# Patient Record
Sex: FEMALE | Race: WHITE | NOT HISPANIC OR LATINO | URBAN - METROPOLITAN AREA
[De-identification: names, ages, dates, MRNs, and addresses within clinical notes are randomized per-mention and may not be internally consistent; named-entity substitution may affect disease eponyms.]

---

## 2017-11-18 ENCOUNTER — EMERGENCY (EMERGENCY)
Facility: HOSPITAL | Age: 42
LOS: 1 days | Discharge: HOME | End: 2017-11-18
Admitting: EMERGENCY MEDICINE
Payer: MEDICAID

## 2017-11-18 VITALS
DIASTOLIC BLOOD PRESSURE: 97 MMHG | SYSTOLIC BLOOD PRESSURE: 144 MMHG | TEMPERATURE: 98 F | OXYGEN SATURATION: 100 % | RESPIRATION RATE: 18 BRPM | HEART RATE: 77 BPM

## 2017-11-18 DIAGNOSIS — Z79.899 OTHER LONG TERM (CURRENT) DRUG THERAPY: ICD-10-CM

## 2017-11-18 DIAGNOSIS — Z79.891 LONG TERM (CURRENT) USE OF OPIATE ANALGESIC: ICD-10-CM

## 2017-11-18 DIAGNOSIS — R53.81 OTHER MALAISE: ICD-10-CM

## 2017-11-18 DIAGNOSIS — R50.9 FEVER, UNSPECIFIED: ICD-10-CM

## 2017-11-18 DIAGNOSIS — R53.1 WEAKNESS: ICD-10-CM

## 2017-11-18 DIAGNOSIS — R11.2 NAUSEA WITH VOMITING, UNSPECIFIED: ICD-10-CM

## 2017-11-18 DIAGNOSIS — R51 HEADACHE: ICD-10-CM

## 2017-11-18 LAB
ALBUMIN SERPL ELPH-MCNC: 3.8 G/DL — SIGNIFICANT CHANGE UP (ref 3.4–5)
ALP SERPL-CCNC: 36 U/L — LOW (ref 40–120)
ALT FLD-CCNC: 21 U/L — SIGNIFICANT CHANGE UP (ref 12–42)
ANION GAP SERPL CALC-SCNC: 5 MMOL/L — LOW (ref 9–16)
APPEARANCE UR: CLEAR — SIGNIFICANT CHANGE UP
AST SERPL-CCNC: 15 U/L — SIGNIFICANT CHANGE UP (ref 15–37)
BILIRUB SERPL-MCNC: 0.6 MG/DL — SIGNIFICANT CHANGE UP (ref 0.2–1.2)
BILIRUB UR-MCNC: NEGATIVE — SIGNIFICANT CHANGE UP
BUN SERPL-MCNC: 19 MG/DL — SIGNIFICANT CHANGE UP (ref 7–23)
CALCIUM SERPL-MCNC: 9.1 MG/DL — SIGNIFICANT CHANGE UP (ref 8.5–10.5)
CHLORIDE SERPL-SCNC: 105 MMOL/L — SIGNIFICANT CHANGE UP (ref 96–108)
CO2 SERPL-SCNC: 27 MMOL/L — SIGNIFICANT CHANGE UP (ref 22–31)
COLOR SPEC: YELLOW — SIGNIFICANT CHANGE UP
CREAT SERPL-MCNC: 0.86 MG/DL — SIGNIFICANT CHANGE UP (ref 0.5–1.3)
DIFF PNL FLD: NEGATIVE — SIGNIFICANT CHANGE UP
GLUCOSE SERPL-MCNC: 73 MG/DL — SIGNIFICANT CHANGE UP (ref 70–99)
GLUCOSE UR QL: NEGATIVE — SIGNIFICANT CHANGE UP
HCG UR QL: NEGATIVE — SIGNIFICANT CHANGE UP
HCT VFR BLD CALC: 39.4 % — SIGNIFICANT CHANGE UP (ref 34.5–45)
HGB BLD-MCNC: 13.6 G/DL — SIGNIFICANT CHANGE UP (ref 11.5–15.5)
KETONES UR-MCNC: NEGATIVE — SIGNIFICANT CHANGE UP
LEUKOCYTE ESTERASE UR-ACNC: NEGATIVE — SIGNIFICANT CHANGE UP
MCHC RBC-ENTMCNC: 31.2 PG — SIGNIFICANT CHANGE UP (ref 27–34)
MCHC RBC-ENTMCNC: 34.5 G/DL — SIGNIFICANT CHANGE UP (ref 32–36)
MCV RBC AUTO: 90.4 FL — SIGNIFICANT CHANGE UP (ref 80–100)
NITRITE UR-MCNC: NEGATIVE — SIGNIFICANT CHANGE UP
PH UR: 7 — SIGNIFICANT CHANGE UP (ref 5–8)
PLATELET # BLD AUTO: 296 K/UL — SIGNIFICANT CHANGE UP (ref 150–400)
POTASSIUM SERPL-MCNC: 4.1 MMOL/L — SIGNIFICANT CHANGE UP (ref 3.5–5.3)
POTASSIUM SERPL-SCNC: 4.1 MMOL/L — SIGNIFICANT CHANGE UP (ref 3.5–5.3)
PROT SERPL-MCNC: 6.8 G/DL — SIGNIFICANT CHANGE UP (ref 6.4–8.2)
PROT UR-MCNC: NEGATIVE MG/DL — SIGNIFICANT CHANGE UP
RBC # BLD: 4.36 M/UL — SIGNIFICANT CHANGE UP (ref 3.8–5.2)
RBC # FLD: 11.5 % — SIGNIFICANT CHANGE UP (ref 10.3–16.9)
SODIUM SERPL-SCNC: 137 MMOL/L — SIGNIFICANT CHANGE UP (ref 132–145)
SP GR SPEC: 1.01 — SIGNIFICANT CHANGE UP (ref 1–1.03)
UROBILINOGEN FLD QL: 0.2 E.U./DL — SIGNIFICANT CHANGE UP
WBC # BLD: 10.8 K/UL — HIGH (ref 3.8–10.5)
WBC # FLD AUTO: 10.8 K/UL — HIGH (ref 3.8–10.5)

## 2017-11-18 PROCEDURE — 99284 EMERGENCY DEPT VISIT MOD MDM: CPT

## 2017-11-18 PROCEDURE — 74177 CT ABD & PELVIS W/CONTRAST: CPT | Mod: 26

## 2017-11-18 RX ORDER — SODIUM CHLORIDE 9 MG/ML
1000 INJECTION INTRAMUSCULAR; INTRAVENOUS; SUBCUTANEOUS ONCE
Qty: 0 | Refills: 0 | Status: COMPLETED | OUTPATIENT
Start: 2017-11-18 | End: 2017-11-18

## 2017-11-18 RX ORDER — ONDANSETRON 8 MG/1
4 TABLET, FILM COATED ORAL ONCE
Qty: 0 | Refills: 0 | Status: COMPLETED | OUTPATIENT
Start: 2017-11-18 | End: 2017-11-18

## 2017-11-18 RX ORDER — METOCLOPRAMIDE HCL 10 MG
10 TABLET ORAL ONCE
Qty: 0 | Refills: 0 | Status: COMPLETED | OUTPATIENT
Start: 2017-11-18 | End: 2017-11-18

## 2017-11-18 RX ORDER — IOHEXOL 300 MG/ML
50 INJECTION, SOLUTION INTRAVENOUS ONCE
Qty: 0 | Refills: 0 | Status: COMPLETED | OUTPATIENT
Start: 2017-11-18 | End: 2017-11-18

## 2017-11-18 RX ORDER — KETOROLAC TROMETHAMINE 30 MG/ML
30 SYRINGE (ML) INJECTION ONCE
Qty: 0 | Refills: 0 | Status: DISCONTINUED | OUTPATIENT
Start: 2017-11-18 | End: 2017-11-18

## 2017-11-18 RX ADMIN — IOHEXOL 50 MILLILITER(S): 300 INJECTION, SOLUTION INTRAVENOUS at 21:40

## 2017-11-18 RX ADMIN — ONDANSETRON 4 MILLIGRAM(S): 8 TABLET, FILM COATED ORAL at 21:29

## 2017-11-18 RX ADMIN — SODIUM CHLORIDE 1000 MILLILITER(S): 9 INJECTION INTRAMUSCULAR; INTRAVENOUS; SUBCUTANEOUS at 21:29

## 2017-11-18 NOTE — ED PROVIDER NOTE - NS ED ROS FT
Denies diarrhea, constipation, abdominal pain, urinary symptoms, chest pain, palpitations, shortness of breath, dyspnea on exertion, syncope/near syncope, cough/URI symptoms, headache, weakness, numbness, focal deficits, visual changes, gait or balance changes, dizziness

## 2017-11-18 NOTE — ED PROVIDER NOTE - PHYSICAL EXAMINATION
VITAL SIGNS: I have reviewed nursing notes and confirm.  CONSTITUTIONAL: Well-developed; well-nourished; in no acute distress.  SKIN: Skin is warm and dry, no acute rash.  HEAD: Normocephalic; atraumatic.  EYES: PERRL, EOM intact; conjunctiva and sclera clear.  ENT: No nasal discharge; airway clear.  NECK: Supple; non tender.  CARD: S1, S2 normal; no murmurs, gallops, or rubs. Regular rate and rhythm.  RESP: No wheezes, rales or rhonchi.  ABD: Normal bowel sounds; soft; non-distended; +++tenderness to RLQ with very mild guarding. (-) rovsigns  EXT: Normal ROM. No clubbing, cyanosis or edema.  NEURO: Alert, oriented. Grossly unremarkable.  PSYCH: Cooperative, appropriate.

## 2017-11-18 NOTE — ED ADULT TRIAGE NOTE - CHIEF COMPLAINT QUOTE
Pt. c/o generalized weakness, nausea, vomiting and diarrhea. Pt. reports staying at a hotel who informed her a few guests have been recently diagnosed with Legionnaires disease. Pt. is concerned and would like to be evaluated

## 2017-11-18 NOTE — ED PROVIDER NOTE - MEDICAL DECISION MAKING DETAILS
Pt with nausea vomiting, chills/body aches. Will send urien legionella. Will also get basic labs, urine, preg. Given tenderness to RLQ in setting of N/V will egt CT abd/pelvis with contrast. Giving IV hydration, Toradol and reglan for headache/nausea

## 2017-11-18 NOTE — ED PROVIDER NOTE - OBJECTIVE STATEMENT
43 y/o F    no PMHx    Pt presents to ER with c/o 43 y/o F    no PMHx    Pt presents to ER with c/o nausea, vomiting, body aches, chills, subjective fevers, general malaise and headache. States sxs began this afternoon. Pt is concerned because she has been staying at a hotel for 3 days and was informed by [facility] that there had been 2 cases of +Legionella at that hotel- and the water also tested positive for it. Pt concerned she may have legionella. Denies abd pain, urinary sxs, diarrhea, constipation, vaginal bleeding/pain/discharge

## 2017-11-18 NOTE — ED PROVIDER NOTE - PROGRESS NOTE DETAILS
CT shows likely R ruptured hemorrhagic cyst- consistent with RLQ tenderness. normal appendix. Labs unremarkable. Nausea and headache resolved with zofran/toradol. pain now 2/10. vitals normal. Urin legionella sent off- although low clinical suspicion. Pt eating and drinking tea in room- state she is ready to go home. Will DC with return precautions.

## 2017-11-19 VITALS
TEMPERATURE: 99 F | DIASTOLIC BLOOD PRESSURE: 83 MMHG | SYSTOLIC BLOOD PRESSURE: 123 MMHG | OXYGEN SATURATION: 98 % | RESPIRATION RATE: 16 BRPM | HEART RATE: 80 BPM

## 2017-11-19 LAB — LEGIONELLA AG UR QL: NEGATIVE — SIGNIFICANT CHANGE UP

## 2017-11-19 RX ADMIN — Medication 30 MILLIGRAM(S): at 00:05

## 2017-11-19 RX ADMIN — Medication 10 MILLIGRAM(S): at 00:06

## 2017-11-21 LAB — LEGIONELLA AB SER-ACNC: NEGATIVE — SIGNIFICANT CHANGE UP

## 2018-03-06 ENCOUNTER — EMERGENCY (EMERGENCY)
Facility: HOSPITAL | Age: 43
LOS: 1 days | Discharge: HOME | End: 2018-03-06
Attending: EMERGENCY MEDICINE | Admitting: EMERGENCY MEDICINE
Payer: MEDICAID

## 2018-03-06 VITALS
TEMPERATURE: 98 F | DIASTOLIC BLOOD PRESSURE: 86 MMHG | SYSTOLIC BLOOD PRESSURE: 128 MMHG | HEART RATE: 79 BPM | OXYGEN SATURATION: 100 % | RESPIRATION RATE: 17 BRPM

## 2018-03-06 PROCEDURE — 99282 EMERGENCY DEPT VISIT SF MDM: CPT

## 2018-03-06 RX ORDER — POLYMYXIN B SULF/TRIMETHOPRIM 10000-1/ML
1 DROPS OPHTHALMIC (EYE)
Qty: 1 | Refills: 0 | OUTPATIENT
Start: 2018-03-06 | End: 2018-03-12

## 2018-03-06 NOTE — ED ADULT TRIAGE NOTE - CHIEF COMPLAINT QUOTE
c.o throat pain mostly at nighttime , intermittent fevers , head congestion  also c/o right red eye redness with drainage

## 2018-03-06 NOTE — ED PROVIDER NOTE - EYES, MLM
Clear bilaterally, pupils equal, round and reactive to light. Clear bilaterally, pupils equal, round and reactive to light. (+) mild conjunctival injection R side, no discharge, normal anterior chamber, no lid edema or erythema no swelling, no orbital tenderness

## 2018-03-10 DIAGNOSIS — Z79.891 LONG TERM (CURRENT) USE OF OPIATE ANALGESIC: ICD-10-CM

## 2018-03-10 DIAGNOSIS — Z79.899 OTHER LONG TERM (CURRENT) DRUG THERAPY: ICD-10-CM

## 2018-03-10 DIAGNOSIS — H10.9 UNSPECIFIED CONJUNCTIVITIS: ICD-10-CM

## 2018-03-10 DIAGNOSIS — H57.8 OTHER SPECIFIED DISORDERS OF EYE AND ADNEXA: ICD-10-CM

## 2019-04-28 ENCOUNTER — EMERGENCY (EMERGENCY)
Facility: HOSPITAL | Age: 44
LOS: 1 days | Discharge: HOME | End: 2019-04-28
Attending: EMERGENCY MEDICINE | Admitting: EMERGENCY MEDICINE
Payer: MEDICAID

## 2019-04-28 VITALS
SYSTOLIC BLOOD PRESSURE: 132 MMHG | DIASTOLIC BLOOD PRESSURE: 87 MMHG | HEART RATE: 87 BPM | OXYGEN SATURATION: 98 % | RESPIRATION RATE: 18 BRPM | TEMPERATURE: 98 F

## 2019-04-28 DIAGNOSIS — Z79.2 LONG TERM (CURRENT) USE OF ANTIBIOTICS: ICD-10-CM

## 2019-04-28 DIAGNOSIS — H57.89 OTHER SPECIFIED DISORDERS OF EYE AND ADNEXA: ICD-10-CM

## 2019-04-28 DIAGNOSIS — Z79.891 LONG TERM (CURRENT) USE OF OPIATE ANALGESIC: ICD-10-CM

## 2019-04-28 DIAGNOSIS — Z79.899 OTHER LONG TERM (CURRENT) DRUG THERAPY: ICD-10-CM

## 2019-04-28 DIAGNOSIS — H18.891 OTHER SPECIFIED DISORDERS OF CORNEA, RIGHT EYE: ICD-10-CM

## 2019-04-28 DIAGNOSIS — H57.11 OCULAR PAIN, RIGHT EYE: ICD-10-CM

## 2019-04-28 PROCEDURE — 99283 EMERGENCY DEPT VISIT LOW MDM: CPT | Mod: 25

## 2019-04-28 RX ORDER — FLUORESCEIN SODIUM 9 MG
1 STRIP OPHTHALMIC (EYE) ONCE
Qty: 0 | Refills: 0 | Status: COMPLETED | OUTPATIENT
Start: 2019-04-28 | End: 2019-04-28

## 2019-04-28 RX ADMIN — Medication 1 DROP(S): at 01:25

## 2019-04-28 RX ADMIN — Medication 1 APPLICATION(S): at 01:25

## 2019-04-28 NOTE — ED PROVIDER NOTE - OBJECTIVE STATEMENT
Pt is a 42yo F who p/w R eye pain x 1 day.  Pt reports pain to R eye after putting contacts lenses in this morning.  Reports scratchy irritation, worse with blinking.  No blurry vision.  No vision loss.  Denies any trauma.  Attempted to take out her contact lens and noted that there was a tear in it.  Admit she has been wearing these disposable contacts for longer than she should be.  Also admits to sleeping in them.  No redness.  No discharge.

## 2019-04-28 NOTE — ED ADULT TRIAGE NOTE - CHIEF COMPLAINT QUOTE
walk in patient with complaints of right eye irritation today, reports she wear contacts and noticed today that her contact was torn in half. Denies vision change from baseline.

## 2019-04-28 NOTE — ED PROVIDER NOTE - CLINICAL SUMMARY MEDICAL DECISION MAKING FREE TEXT BOX
Corneal irritation from torn contact lens use.  Started on Moxifloxacin drops here - given bottle.  Will f/u with her eye doctor on Monday.  Will stop wearing contacts until fully healed.

## 2019-04-28 NOTE — ED PROVIDER NOTE - NSFOLLOWUPINSTRUCTIONS_ED_ALL_ED_FT
PLEASE FOLLOW-UP WITH YOUR EYE DOCTOR ON MONDAY.     USE MOXEZA EYE DROPS TO YOUR RIGHT EYE THREE TIMES A DAY FOR 1 WEEK.      DO NOT WEAR CONTACT TO RIGHT EYE FOR 1 WEEK.     RETURN HERE FOR ANY VISION LOSS, SEVERE PAIN, EYE REDNESS, OR ANY OTHER CONCERNS.

## 2019-04-28 NOTE — ED PROVIDER NOTE - PHYSICAL EXAMINATION
CONSTITUTIONAL: Well appearing, well nourished, awake, alert, oriented to person, place, time/situation and in no apparent distress.  ENT: Airway patent, Nasal mucosa clear. Mouth with normal mucosa.  EYES: Snellen 10/100 on R (no contact in), 10/20 on L (has contact in).  Sclera are clear and non-erythematous.  EOM intact.  PERRL.  Fluorescein shows mild irritation but no large abrasions.   RESPIRATORY: Breathing comfortably with normal RR.  MSK: Range of motion is not limited, no deformities noted.  NEURO: Alert and oriented, no focal deficits.  SKIN: Skin normal color for race, warm, dry and intact. No evidence of rash.  PSYCH: Alert and oriented to person, place, time/situation. normal mood and affect. no apparent risk to self or others.

## 2019-04-28 NOTE — ED ADULT TRIAGE NOTE - AS TEMP SITE
IV access removed x2, catheter intact, patient tolerated well, gauze and tape applied to site. Telemetry removed and returned to monitor room. Educated patient on discharge instructions regarding new/change/continued/discontinued medications, follow-up appointments, diet, activity, and , patient states understanding, no further questions needing answered. Pt transferred off unit via WC by Yrn eid.   oral

## 2020-09-02 NOTE — ED ADULT NURSE NOTE - NS ED PATIENT SAFETY CONCERN
Please follow up with Dr. Miller on ___________  at [facility].     **Please arrive at [facility] ONE HOUR PRIOR TO APPOINTMENT TIME to get a CHEST XRAY (script in folder). Go directly to the Radiology Department.***    The cardiac surgery office is on the second floor of [facility]. If further directions are needed, please ask for assistance at the .    Please call the CT Surgery office at [phone] if your appointment needs to be rescheduled.    Your Care Navigator Nurse Practitioner will be in touch to see you in your home within a few days from discharge. The Follow Your Heart program can help ensure you understand your medications, discharge instructions and answer any questions you may have at that time. They are also a great source to address concerns during the day and may be reached at [phone].    Please follow up with your Pulmonologist Dr. Stanton for a sleep study straight from being discharged from the hospital.    Please follow up with your Cardiologist and Primary Care Physician 2-4 weeks from discharge. No Please follow up with Dr. Miller on 9/16 at 10am  at [facility].     **Please arrive at [facility] ONE HOUR PRIOR TO APPOINTMENT TIME to get a CHEST XRAY (script in folder). Go directly to the Radiology Department.***    Please follow up with Dr Higgins 9/8 at 4:15    The cardiac surgery office is on the second floor of [facility]. If further directions are needed, please ask for assistance at the .    Please call the CT Surgery office at [phone] if your appointment needs to be rescheduled.    Your Care Navigator Nurse Practitioner will be in touch to see you in your home within a few days from discharge. The Follow Your Heart program can help ensure you understand your medications, discharge instructions and answer any questions you may have at that time. They are also a great source to address concerns during the day and may be reached at [phone].    Please follow up with your Pulmonologist Dr. Stanton for a sleep study straight from being discharged from the hospital.    Please follow up with your Cardiologist and Primary Care Physician 2-4 weeks from discharge. Please follow up with Dr. Miller on 9/16 at 10am  at [facility].     **Please arrive at [facility] ONE HOUR PRIOR TO APPOINTMENT TIME to get a CHEST XRAY (script in folder). Go directly to the Radiology Department.***    Please follow up with Dr Higgins 9/8 at 4:15    Please call to make an appointment to see Dr Rausch next week     The cardiac surgery office is on the second floor of [facility]. If further directions are needed, please ask for assistance at the .    Please call the CT Surgery office at [phone] if your appointment needs to be rescheduled.    Your Care Navigator Nurse Practitioner will be in touch to see you in your home within a few days from discharge. The Follow Your Heart program can help ensure you understand your medications, discharge instructions and answer any questions you may have at that time. They are also a great source to address concerns during the day and may be reached at [phone].    Please follow up with your Pulmonologist Dr. Stanton for a sleep study straight from being discharged from the hospital.    Please follow up with your Cardiologist and Primary Care Physician 2-4 weeks from discharge.

## 2021-05-12 ENCOUNTER — APPOINTMENT (OUTPATIENT)
Age: 46
End: 2021-05-12

## 2021-05-18 ENCOUNTER — APPOINTMENT (OUTPATIENT)
Age: 46
End: 2021-05-18

## 2021-09-16 ENCOUNTER — APPOINTMENT (OUTPATIENT)
Dept: AFTER HOURS CARE | Facility: EMERGENCY ROOM | Age: 46
End: 2021-09-16
Payer: MEDICAID

## 2021-09-16 DIAGNOSIS — M54.12 RADICULOPATHY, CERVICAL REGION: ICD-10-CM

## 2021-09-16 PROCEDURE — 99204 OFFICE O/P NEW MOD 45 MIN: CPT | Mod: 95

## 2021-09-16 RX ORDER — PREDNISONE 20 MG/1
20 TABLET ORAL DAILY
Qty: 12 | Refills: 0 | Status: ACTIVE | COMMUNITY
Start: 2021-09-16 | End: 1900-01-01

## 2021-09-16 NOTE — PLAN
[FreeTextEntry1] : Does not need emergent referral to ED for mri/spine eval\par Prednisone 40mg QD x 6 days\par Aleve 500 BID x 10 days\par Tylenol 500 mg QID x 30 days\par Spine referral\par Long d/w patient re warning signs to necessitate presentation to ED for emergent spine eval

## 2021-09-16 NOTE — REVIEW OF SYSTEMS
[Fever] : no fever [Joint Stiffness] : no joint stiffness [Joint Pain] : no joint pain [Joint Swelling] : no joint swelling [Muscle Weakness] : no muscle weakness [Muscle Pain] : no muscle pain [Back Pain] : no back pain

## 2021-09-16 NOTE — PHYSICAL EXAM
[No Acute Distress] : no acute distress [Well Nourished] : well nourished [Well Developed] : well developed [Well-Appearing] : well-appearing [Normal Sclera/Conjunctiva] : normal sclera/conjunctiva [Normal Outer Ear/Nose] : the outer ears and nose were normal in appearance [No JVD] : no jugular venous distention [Supple] : supple [No Respiratory Distress] : no respiratory distress  [No Spinal Tenderness] : no spinal tenderness [No Joint Swelling] : no joint swelling [Coordination Grossly Intact] : coordination grossly intact [No Focal Deficits] : no focal deficits [Normal Affect] : the affect was normal [Normal Insight/Judgement] : insight and judgment were intact [de-identified] : ROM slightly reduced c rotation to L but able to rotate >45 degrees.  Negative spurling.   [de-identified] : ROM in b/l UE symmetric.  Reported  strength symmetric b/l.  No sensory deficits to b/l hands with self palpation

## 2021-09-16 NOTE — ASSESSMENT
[FreeTextEntry1] : L cervical radiculopathy without emergent features; known c5/6 herniation.  \par \par

## 2021-09-16 NOTE — HISTORY OF PRESENT ILLNESS
[Home] : at home, [unfilled] , at the time of the visit. [Other Location: e.g. Home (Enter Location, City,State)___] : at [unfilled] [Verbal consent obtained from patient] : the patient, [unfilled] [FreeTextEntry8] : Known C5-6 disc herniation, dx several years ago with a neurologist.  \par Daily pain but worsening today.  A/W stress, which has been increased lately\par Last MRI 2017 -- at that time showed disc impingement on cord and surgery was recommended but patient declined\par Radiatino to L shoulder and L pinky\par No weakness; primarily paresthesias\par Improving with activity and movement\par Takes intermittent nsaids/asa \par Took aleve today \par PMH -- None \par PSH -- None\par Meds -- Welbutrin, NSAIDs PRN\par Allergies -- NKDA\par

## 2021-09-21 ENCOUNTER — APPOINTMENT (OUTPATIENT)
Dept: MAMMOGRAPHY | Facility: CLINIC | Age: 46
End: 2021-09-21

## 2022-02-21 ENCOUNTER — EMERGENCY (EMERGENCY)
Facility: HOSPITAL | Age: 47
LOS: 1 days | Discharge: HOME | End: 2022-02-21
Admitting: EMERGENCY MEDICINE
Payer: MEDICAID

## 2022-02-21 VITALS
SYSTOLIC BLOOD PRESSURE: 170 MMHG | HEART RATE: 99 BPM | OXYGEN SATURATION: 99 % | DIASTOLIC BLOOD PRESSURE: 100 MMHG | RESPIRATION RATE: 20 BRPM | TEMPERATURE: 99 F

## 2022-02-21 PROCEDURE — 99284 EMERGENCY DEPT VISIT MOD MDM: CPT

## 2022-02-21 RX ORDER — TIZANIDINE 4 MG/1
2 TABLET ORAL
Qty: 84 | Refills: 0
Start: 2022-02-21 | End: 2022-03-06

## 2022-02-21 RX ORDER — GABAPENTIN 400 MG/1
300 CAPSULE ORAL ONCE
Refills: 0 | Status: COMPLETED | OUTPATIENT
Start: 2022-02-21 | End: 2022-02-21

## 2022-02-21 RX ORDER — CYCLOBENZAPRINE HYDROCHLORIDE 10 MG/1
10 TABLET, FILM COATED ORAL ONCE
Refills: 0 | Status: COMPLETED | OUTPATIENT
Start: 2022-02-21 | End: 2022-02-21

## 2022-02-21 RX ORDER — KETOROLAC TROMETHAMINE 30 MG/ML
30 SYRINGE (ML) INJECTION ONCE
Refills: 0 | Status: DISCONTINUED | OUTPATIENT
Start: 2022-02-21 | End: 2022-02-21

## 2022-02-21 RX ORDER — LIDOCAINE 4 G/100G
1 CREAM TOPICAL ONCE
Refills: 0 | Status: COMPLETED | OUTPATIENT
Start: 2022-02-21 | End: 2022-02-21

## 2022-02-21 RX ORDER — IBUPROFEN 200 MG
1 TABLET ORAL
Qty: 56 | Refills: 0
Start: 2022-02-21 | End: 2022-03-06

## 2022-02-21 RX ORDER — GABAPENTIN 400 MG/1
1 CAPSULE ORAL
Qty: 42 | Refills: 0
Start: 2022-02-21 | End: 2022-03-06

## 2022-02-21 RX ORDER — LIDOCAINE 4 G/100G
1 CREAM TOPICAL
Qty: 7 | Refills: 0
Start: 2022-02-21

## 2022-02-21 RX ADMIN — LIDOCAINE 1 PATCH: 4 CREAM TOPICAL at 16:29

## 2022-02-21 RX ADMIN — CYCLOBENZAPRINE HYDROCHLORIDE 10 MILLIGRAM(S): 10 TABLET, FILM COATED ORAL at 16:30

## 2022-02-21 RX ADMIN — GABAPENTIN 300 MILLIGRAM(S): 400 CAPSULE ORAL at 16:31

## 2022-02-21 RX ADMIN — Medication 30 MILLIGRAM(S): at 16:30

## 2022-02-21 NOTE — ED PROVIDER NOTE - OBJECTIVE STATEMENT
45 yo f pmhx sig for herniated disk C4C5 pw gradually worsening cervical radiculopathy like pain radiating down the R arm shooting mod in severity with no weakness or numbness; the neck pain has been present along the L trapezius but over the course of the last 2 wk the pain has increased in severity to mod, pt was no longer able to control it with Excedrin.  Denies weakness or numbness, ivdu, tb, malignancy, falls or trauma, difficulty ambulating or urinating.    I have reviewed available current nursing and previous documentation of past medical, surgical, family, and/or social history.

## 2022-02-21 NOTE — ED PROVIDER NOTE - PHYSICAL EXAMINATION
Physical Exam    Vital Signs: I have reviewed the initial vital signs.  Constitutional: well-nourished, appears stated age, no acute distress  Cardiovascular: regular rate, regular rhythm, well-perfused extremities, radial pulse +2 and equal b/l   Respiratory: unlabored respiratory effort  Musculoskeletal: +TTP over the L proximal trapezius and L supraspinatus, able to fully range the neck and shoulder, no signs of trauma  Integumentary: warm, dry, no rash  Neurologic: awake, alert, cranial nerves II-XII grossly intact, extremities’ motor and sensory functions grossly intact with 5/5 in ulnar, median and radial n destribution

## 2022-02-21 NOTE — ED PROVIDER NOTE - NS ED ROS FT
Review of Systems    Constitutional: (-) fever  Eyes/ENT: (-) change in vision, (-) sore throat, (-) ear pain  Cardiovascular: (-) chest pain, (-) palpitation   Respiratory: (-) cough, (-) shortness of breath  Gastrointestinal: (-) abdominal pain (-) vomiting, (-) diarrhea  Musculoskeletal: (+) neck pain, (-) back pain, (-) joint pain  Integumentary: (-) rash, (-) edema

## 2022-02-21 NOTE — ED ADULT NURSE NOTE - OBJECTIVE STATEMENT
Pt aox3 with steady gait. pt walk in, c/o chronic pain at c4 and c5- has herniated discs. pain accompanied by headache x wks . denies dizziness or vision changes. with left shoulder pain denies weakness.

## 2022-02-21 NOTE — ED PROVIDER NOTE - CARE PROVIDER_API CALL
Darinel Mitchell)  Neurosurgery  [address] [place] [address]  Phone: [phone]  Fax: [phone]  Follow Up Time: 1-3 Days

## 2022-02-21 NOTE — ED PROVIDER NOTE - PATIENT PORTAL LINK FT
You can access the FollowMyHealth Patient Portal offered by [facility] by registering at the following website: [email or web address]. By joining [employer]’s FollowMyHealth portal, you will also be able to view your health information using other applications (apps) compatible with our system.

## 2022-02-21 NOTE — ED PROVIDER NOTE - CLINICAL SUMMARY MEDICAL DECISION MAKING FREE TEXT BOX
47 yo f pmhx sig for herniated disk C4C5 pw gradually worsening cervical radiculopathy like pain radiating down the R arm shooting mod in severity with no weakness or numbness; the neck pain has been present along the L trapezius but over the course of the last 2 wk the pain has increased in severity to mod, pt was no longer able to control it with Excedrin.  Denies weakness or numbness, ivdu, tb, malignancy, falls or trauma, difficulty ambulating or urinating. Pt had mild ttp over the L trapezius -- pt was neurovascular intact, follow up with neurosurgery given.

## 2022-02-25 DIAGNOSIS — M51.26 OTHER INTERVERTEBRAL DISC DISPLACEMENT, LUMBAR REGION: ICD-10-CM

## 2022-02-25 DIAGNOSIS — G89.29 OTHER CHRONIC PAIN: ICD-10-CM

## 2022-02-25 DIAGNOSIS — R51.9 HEADACHE, UNSPECIFIED: ICD-10-CM

## 2022-02-25 DIAGNOSIS — M54.2 CERVICALGIA: ICD-10-CM

## 2022-06-16 NOTE — ED ADULT NURSE NOTE - NS ED NURSE LEVEL OF CONSCIOUSNESS ORIENTATION
Detail Level: Generalized Include Location In Plan?: No Oriented - self; Oriented - place; Oriented - time

## 2022-06-22 ENCOUNTER — EMERGENCY (EMERGENCY)
Facility: HOSPITAL | Age: 47
LOS: 1 days | Discharge: HOME | End: 2022-06-22
Attending: EMERGENCY MEDICINE | Admitting: EMERGENCY MEDICINE
Payer: MEDICAID

## 2022-06-22 VITALS
TEMPERATURE: 99 F | WEIGHT: 134.92 LBS | OXYGEN SATURATION: 96 % | SYSTOLIC BLOOD PRESSURE: 123 MMHG | DIASTOLIC BLOOD PRESSURE: 83 MMHG | RESPIRATION RATE: 18 BRPM | HEART RATE: 90 BPM

## 2022-06-22 PROCEDURE — 99284 EMERGENCY DEPT VISIT MOD MDM: CPT

## 2022-06-22 RX ORDER — ACETAMINOPHEN 500 MG
650 TABLET ORAL ONCE
Refills: 0 | Status: COMPLETED | OUTPATIENT
Start: 2022-06-22 | End: 2022-06-22

## 2022-06-22 RX ORDER — IBUPROFEN 200 MG
600 TABLET ORAL ONCE
Refills: 0 | Status: COMPLETED | OUTPATIENT
Start: 2022-06-22 | End: 2022-06-22

## 2022-06-22 RX ADMIN — Medication 600 MILLIGRAM(S): at 23:36

## 2022-06-22 RX ADMIN — Medication 650 MILLIGRAM(S): at 23:36

## 2022-06-22 NOTE — ED PROVIDER NOTE - PATIENT PORTAL LINK FT
You can access the FollowMyHealth Patient Portal offered by [facility] by registering at the following website: [email or web address]. By joining [employer]’s FollowMyHealth portal, you will also be able to view your health information using other applications (apps) compatible with our system.

## 2022-06-22 NOTE — ED PROVIDER NOTE - NSFOLLOWUPINSTRUCTIONS_ED_ALL_ED_FT
COVID-19: What to Do if You Are Sick      If you test positive and are an older adult or someone who is at high risk of getting very sick from COVID-19, treatment may be available. Contact a healthcare provider right away after a positive test to determine if you are eligible, even if your symptoms are mild right now. You can also visit a Test to Treat location and, if eligible, receive a prescription from a provider. Don't delay: Treatment must be started within the first few days to be effective.    If you have a fever, cough, or other symptoms, you might have COVID-19. Most people have mild illness and are able to recover at home. If you are sick:  •Keep track of your symptoms.      •If you have an emergency warning sign (including trouble breathing), call 911.        Steps to help prevent the spread of COVID-19 if you are sick    If you are sick with COVID-19 or think you might have COVID-19, follow the steps below to care for yourself and to help protect other people in your home and community.    Stay home except to get medical care     • Stay home. Most people with COVID-19 have mild illness and can recover at home without medical care. Do not leave your home, except to get medical care. Do not visit public areas and do not go to places where you are unable to wear a mask.      • Take care of yourself. Get rest and stay hydrated. Take over-the-counter medicines, such as acetaminophen, to help you feel better.      • Stay in touch with your doctor. Call before you get medical care. Be sure to get care if you have trouble breathing, or have any other emergency warning signs, or if you think it is an emergency.      • Avoid public transportation, ride-sharing, or taxis if possible.      Get tested     •If you have symptoms of COVID-19, get tested. While waiting for test results, stay away from others, including staying apart from those living in your household.      • Get tested as soon as possible after your symptoms start. Treatments may be available for people with COVID-19 who are at risk for becoming very sick. Don't delay: Treatment must be started early to be effective—some treatments must begin within 5 days of your first symptoms. Contact your healthcare provider right away if your test result is positive to determine if you are eligible.      • Self-tests are one of several options for testing for the virus that causes COVID-19 and may be more convenient than laboratory-based tests and point-of-care tests. Ask your healthcare provider or your local health department if you need help interpreting your test results.      •You can visit your [facility], [facility], local, and [facility] health department's website to look for the latest local information on testing sites.      Separate yourself from other people     As much as possible, stay in a specific room and away from other people and pets in your home. If possible, you should use a separate bathroom. If you need to be around other people or animals in or outside of the home, wear a well-fitting mask.    Tell your close contacts that they may have been exposed to COVID-19. An infected person can spread COVID-19 starting 48 hours (or 2 days) before the person has any symptoms or tests positive. By letting your close contacts know they may have been exposed to COVID-19, you are helping to protect everyone.  •See COVID-19 and Animals if you have questions about pets.      •If you are diagnosed with COVID-19, someone from the health department may call you. Answer the call to slow the spread.      Monitor your symptoms    •Symptoms of COVID-19 include fever, cough, or other symptoms.       •Follow care instructions from your healthcare provider and local health department. Your local health authorities may give instructions on checking your symptoms and reporting information.      When to seek emergency medical attention    Look for emergency warning signs* for COVID-19. If someone is showing any of these signs, seek emergency medical care immediately:  •Trouble breathing      •Persistent pain or pressure in the chest      •New confusion      •Inability to wake or stay awake      •Pale, gray, or blue-colored skin, lips, or nail beds, depending on skin tone      *This list is not all possible symptoms. Please call your medical provider for any other symptoms that are severe or concerning to you.    Call 911 or call ahead to your local emergency facility: Notify the  that you are seeking care for someone who has or may have COVID-19.    Call ahead before visiting your doctor    •Call ahead. Many medical visits for routine care are being postponed or done by phone or telemedicine.      •If you have a medical appointment that cannot be postponed, call your doctor's office, and tell them you have or may have COVID-19. This will help the office protect themselves and other patients.      If you are sick, wear a well-fitting mask    •You should wear a mask if you must be around other people or animals, including pets (even at home).      •Wear a mask with the best fit, protection, and comfort for you.      •You don't need to wear the mask if you are alone. If you can't put on a mask (because of trouble breathing, for example), cover your coughs and sneezes in some other way. Try to stay at least 6 feet away from other people. This will help protect the people around you.      •Masks should not be placed on young children under age 2 years, anyone who has trouble breathing, or anyone who is not able to remove the mask without help.      Cover your coughs and sneezes    •Cover your mouth and nose with a tissue when you cough or sneeze.      •Throw away used tissues in a lined trash can.      •Immediately wash your hands with soap and water for at least 20 seconds. If soap and water are not available, clean your hands with an alcohol-based hand  that contains at least 60% alcohol.      Clean your hands often    •Wash your hands often with soap and water for at least 20 seconds. This is especially important after blowing your nose, coughing, or sneezing; going to the bathroom; and before eating or preparing food.      •Use hand  if soap and water are not available. Use an alcohol-based hand  with at least 60% alcohol, covering all surfaces of your hands and rubbing them together until they feel dry.      •Soap and water are the best option, especially if hands are visibly dirty.       •Avoid touching your eyes, nose, and mouth with unwashed hands.      •Handwashing Tips      Avoid sharing personal household items    •Do not share dishes, drinking glasses, cups, eating utensils, towels, or bedding with other people in your home.      •Wash these items thoroughly after using them with soap and water or put in the .      Clean surfaces in your home regularly    •Clean and disinfect high-touch surfaces (for example, doorknobs, tables, handles, light switches, and countertops) in your "sick room" and bathroom. In shared spaces, you should clean and disinfect surfaces and items after each use by the person who is ill.      •If you are sick and cannot clean, a caregiver or other person should only clean and disinfect the area around you (such as your bedroom and bathroom) on an as needed basis. Your caregiver/other person should wait as long as possible (at least several hours) and wear a mask before entering, cleaning, and disinfecting shared spaces that you use.      •Clean and disinfect areas that may have blood, stool, or body fluids on them.    •Use household  and disinfectants. Clean visible dirty surfaces with household  containing soap or detergent. Then, use a household disinfectant.  •Use a product from EPA's List N: Disinfectants for Coronavirus (COVID-19).      •Be sure to follow the instructions on the label to ensure safe and effective use of the product. Many products recommend keeping the surface wet with a disinfectant for a certain period of time (look at "contact time" on the product label).      •You may also need to wear personal protective equipment, such as gloves, depending on the directions on the product label.      •Immediately after disinfecting, wash your hands with soap and water for 20 seconds.      •For completed guidance on cleaning and disinfecting your home, visit Complete Disinfection Guidance.        Take steps to improve ventilation at home    •Improve ventilation (air flow) at home to help prevent from spreading COVID-19 to other people in your household.      •Clear out COVID-19 virus particles in the air by opening windows, using air filters, and turning on fans in your home.      •Use this interactive tool to learn how to improve air flow in your home.        When you can be around others after being sick with COVID-19    Deciding when you can be around others is different for different situations. Find out when you can safely end home isolation.    For any additional questions about your care, contact your healthcare provider or state or local health department.    03/22/2022    Content source: National Center for Immunization and Respiratory Diseases (NCIRD), Division of Viral Diseases    This information is not intended to replace advice given to you by your health care provider. Make sure you discuss any questions you have with your health care provider.      Document Revised: 05/05/2022 Document Reviewed: 05/05/2022    Elsevier Patient Education © 2022 [employer]

## 2022-06-22 NOTE — ED ADULT TRIAGE NOTE - CHIEF COMPLAINT QUOTE
Pt BIBA c/o sore throat. Had a +COVID rapid yesterday. Endorsing chills and malaise. No respiratory distress.

## 2022-06-22 NOTE — ED PROVIDER NOTE - CLINICAL SUMMARY MEDICAL DECISION MAKING FREE TEXT BOX
Patient well appearing, signs and symptoms consistent with covid 19, mild case. Advised supportive care, OTC nsaids, tylenol,rest, return precautions given.

## 2022-06-22 NOTE — ED PROVIDER NOTE - OBJECTIVE STATEMENT
46 yo F, denies pmhx, vaccinated for COVID x 2, tested positive for covid yesterday, symptoms started the day prior here to get "checked."C/o sore throat, body aches and fatigue. Denies issues breathing. Denies SOB, CP.

## 2022-06-22 NOTE — ED ADULT NURSE NOTE - OBJECTIVE STATEMENT
female patient c/o lethargy, malaise and a sore throat s/p (+) rapid test for covid x 1 day. patient is alert verbal oriented x3 able to make needs known. patient in no immediate distress. breathing is even unlabored and normal for rate and effort. pending provider eval. awaiting orders.

## 2022-06-23 RX ORDER — IBUPROFEN 200 MG
1 TABLET ORAL
Qty: 30 | Refills: 0
Start: 2022-06-23 | End: 2022-06-29

## 2022-06-23 RX ORDER — ACETAMINOPHEN 500 MG
2 TABLET ORAL
Qty: 60 | Refills: 0
Start: 2022-06-23

## 2022-06-24 DIAGNOSIS — U07.1 COVID-19: ICD-10-CM

## 2022-06-24 DIAGNOSIS — J02.9 ACUTE PHARYNGITIS, UNSPECIFIED: ICD-10-CM

## 2022-09-15 ENCOUNTER — APPOINTMENT (OUTPATIENT)
Dept: AFTER HOURS CARE | Facility: EMERGENCY ROOM | Age: 47
End: 2022-09-15

## 2022-09-15 DIAGNOSIS — F41.9 ANXIETY DISORDER, UNSPECIFIED: ICD-10-CM

## 2022-09-15 DIAGNOSIS — F43.9 REACTION TO SEVERE STRESS, UNSPECIFIED: ICD-10-CM

## 2022-09-15 DIAGNOSIS — F32.A ANXIETY DISORDER, UNSPECIFIED: ICD-10-CM

## 2022-09-15 PROCEDURE — 99213 OFFICE O/P EST LOW 20 MIN: CPT | Mod: 95

## 2022-09-15 NOTE — HISTORY OF PRESENT ILLNESS
[Home] : at home, [unfilled] , at the time of the visit. [Other Location: e.g. Home (Enter Location, City,State)___] : at [unfilled] [Verbal consent obtained from patient] : the patient, [unfilled] [FreeTextEntry8] : 47y F hx anxiety/depression on wellbutrin now p/w incr social stress recently (unstable living arrangements, most of\par possessions in storage, which cannot be accessed easily). Pt is being asked to provide emotional support\par documentation for their pet so the pet dog can stay with them in the shelter. Pt has no complaints or sx. no SI HI or\par psychotic thoughts.

## 2022-09-15 NOTE — PLAN
[No new medications perscribed] : Treat in place: No new medications prescribed [FreeTextEntry1] : note provided\par psych follow up\par continue meds

## 2022-09-15 NOTE — ASSESSMENT
[FreeTextEntry1] : I see no harm in helping to provide a note that helps a pt's emotional well being.

## 2023-01-05 NOTE — ED ADULT TRIAGE NOTE - CADM TRG TX PRIOR TO ARRIVAL
none pt's sister Mariana Bueno cell# [phone]; home# [phone]  would like to speak to psychiatrist pt requesting librium, CIWA is 3 Pt's sister Mariana Bueno (reports to be HCP), requesting to be involved in healthcare decisions

## 2023-03-20 ENCOUNTER — NON-APPOINTMENT (OUTPATIENT)
Age: 48
End: 2023-03-20

## 2023-03-22 ENCOUNTER — NON-APPOINTMENT (OUTPATIENT)
Age: 48
End: 2023-03-22

## 2024-06-17 NOTE — ED PROVIDER NOTE - CARE PROVIDERS DIRECT ADDRESSES
Patient is accompanied by her daughter Talia today. Talia will be her  today.    CC:  Toshia Harding is here today for Pre-Op Exam   No other concerns.    Medications: medications verified and updated  Refills needed today?  No  denies Latex allergy or sensitivity  Patient would like communication of their results via:  [facility]    Cell Phone:   Telephone Information:   Mobile [phone]     Okay to leave a message containing results? Yes  Tobacco history: verified  Advanced directives: Yes on file  Patient's current [facility] status: Active.         Health Maintenance       COVID-19 Vaccine (7 - 2023-24 season)  Overdue since 4/11/2024    Depression Screening (Yearly)  Due soon on 12/11/2024           Following review of the above:  Patient is not proceeding with: COVID-19. Will discuss when to have Covid Vaccine.    Note: Refer to final orders and clinician documentation.                       [email or web address]

## 2024-12-03 NOTE — ED PROVIDER NOTE - OBJECTIVE STATEMENT
41 y/o female presents to the ED with c/o multiple medical complaints. Pt has URI sx such as nasal congestion. Denies cough and fever. Pt wants tx for pink eye in her right eye.
Statement Selected